# Patient Record
Sex: MALE | Race: WHITE | NOT HISPANIC OR LATINO | Employment: FULL TIME | URBAN - METROPOLITAN AREA
[De-identification: names, ages, dates, MRNs, and addresses within clinical notes are randomized per-mention and may not be internally consistent; named-entity substitution may affect disease eponyms.]

---

## 2018-10-15 DIAGNOSIS — C64.2 MALIGNANT NEOPLASM OF LEFT KIDNEY, EXCEPT RENAL PELVIS (HCC): Primary | ICD-10-CM

## 2018-11-19 ENCOUNTER — OFFICE VISIT (OUTPATIENT)
Dept: UROLOGY | Facility: MEDICAL CENTER | Age: 48
End: 2018-11-19
Payer: COMMERCIAL

## 2018-11-19 VITALS
WEIGHT: 248 LBS | BODY MASS INDEX: 35.5 KG/M2 | HEIGHT: 70 IN | DIASTOLIC BLOOD PRESSURE: 78 MMHG | SYSTOLIC BLOOD PRESSURE: 124 MMHG

## 2018-11-19 DIAGNOSIS — C64.2 MALIGNANT NEOPLASM OF LEFT KIDNEY, EXCEPT RENAL PELVIS (HCC): Primary | ICD-10-CM

## 2018-11-19 DIAGNOSIS — R19.4 CHANGE IN BOWEL HABITS: ICD-10-CM

## 2018-11-19 LAB
SL AMB  POCT GLUCOSE, UA: ABNORMAL
SL AMB LEUKOCYTE ESTERASE,UA: ABNORMAL
SL AMB POCT BILIRUBIN,UA: ABNORMAL
SL AMB POCT BLOOD,UA: ABNORMAL
SL AMB POCT CLARITY,UA: CLEAR
SL AMB POCT COLOR,UA: YELLOW
SL AMB POCT KETONES,UA: ABNORMAL
SL AMB POCT NITRITE,UA: ABNORMAL
SL AMB POCT PH,UA: 7
SL AMB POCT SPECIFIC GRAVITY,UA: 1.01
SL AMB POCT URINE PROTEIN: ABNORMAL
SL AMB POCT UROBILINOGEN: 0.2

## 2018-11-19 PROCEDURE — 81003 URINALYSIS AUTO W/O SCOPE: CPT | Performed by: UROLOGY

## 2018-11-19 PROCEDURE — 99214 OFFICE O/P EST MOD 30 MIN: CPT | Performed by: UROLOGY

## 2018-11-19 RX ORDER — MONTELUKAST SODIUM 10 MG/1
10 TABLET ORAL
Refills: 1 | COMMUNITY
Start: 2018-09-18

## 2018-11-19 RX ORDER — ASPIRIN 81 MG/1
81 TABLET, CHEWABLE ORAL
COMMUNITY
Start: 2018-09-04 | End: 2019-09-04

## 2018-11-19 RX ORDER — FLUTICASONE PROPIONATE 50 MCG
SPRAY, SUSPENSION (ML) NASAL
Refills: 3 | COMMUNITY
Start: 2018-11-07

## 2018-11-19 RX ORDER — DIPHENOXYLATE HYDROCHLORIDE AND ATROPINE SULFATE 2.5; .025 MG/1; MG/1
1 TABLET ORAL
COMMUNITY

## 2018-11-19 NOTE — PATIENT INSTRUCTIONS
I recommend seeing a gastroenterologist for possible colonoscopy due to change in bowel habits, since you are due to undergo this in 2 years anyway

## 2018-11-19 NOTE — LETTER
2018     Lyudmila Fraser, DO  [address] [place] [address]    Patient: Malathi Varela   YOB: 1970   Date of Visit: 2018       Dear Dr Carmen Fountain: Thank you for referring Malathi Varela to me for evaluation  Below are my notes for this consultation  If you have questions, please do not hesitate to call me  I look forward to following your patient along with you  Sincerely,        Blanche Perkins MD        CC: No Recipients  Blanche Perkins MD  2018  9:58 AM  Sign at close encounter  [address] [facility]  [place] [phone]  www  [facility]  org      NAME: Ramirez Zavaleta  AGE: 50 y o  SEX: male  : 1970   MRN: [id number]    DATE: 2018  TIME: 9:49 AM    Assessment and Plan:    Left renal cell carcinoma, 7 years status post left robotic partial nephrectomy with no evidence recurrence  Doing well from my standpoint  With regards to the change in his bowel habits, I recommend that he see gastroenterologist to possibly start colon cancer screening with colonoscopies early due to his history of renal cell carcinoma  Follow-up with me in 1 year with a renal ultrasound  Chief Complaint   No chief complaint on file  History of Present Illness   Left renal cell carcinoma:  7 years status post left robotic partial nephrectomy  Also has chronic microscopic hematuria, and he has a small amount of blood in his urine today  History of renal calculi status post left renal ESWL in   Renal ultrasound 10/16/2018 shows a normal postvoid residual, a duplicated right collecting system, and there was mild scarring in the mid to lower pole the left kidney status post surgery  No evidence recurrence  General health has been good       He has been having some changes in his bowel habits -his bowel function is a little slower as of lately especially related to dietary type things like eating carbohydrate such as a pasta  The following portions of the patient's history were reviewed and updated as appropriate: allergies, current medications, past family history, past medical history, past social history, past surgical history and problem list     Review of Systems   Review of Systems   Gastrointestinal: Positive for constipation  Genitourinary: Negative  Active Problem List   There is no problem list on file for this patient  Objective   /78 (BP Location: Left arm, Patient Position: Sitting)   Ht 5' 10" (1 778 m)   Wt 112 kg (248 lb)   BMI 35 58 kg/m²      Physical Exam   Constitutional: He is oriented to person, place, and time  He appears well-developed and well-nourished  HENT:   Head: Normocephalic and atraumatic  Eyes: EOM are normal    Neck: Normal range of motion  Pulmonary/Chest: Effort normal    Musculoskeletal: Normal range of motion  Neurological: He is alert and oriented to person, place, and time  Skin: Skin is warm and dry  Psychiatric: He has a normal mood and affect   His behavior is normal  Judgment and thought content normal            Current Medications     Current Outpatient Prescriptions:     aspirin 81 mg chewable tablet, Chew 81 mg, Disp: , Rfl:     fluticasone (FLONASE) 50 mcg/act nasal spray, SPRAY 2 SPRAYS INTO EACH NOSTRIL EVERY DAY, Disp: , Rfl: 3    montelukast (SINGULAIR) 10 mg tablet, Take 10 mg by mouth daily at bedtime, Disp: , Rfl: 1    multivitamin (THERAGRAN) TABS, Take 1 tablet by mouth, Disp: , Rfl:         Echo Aguilar MD

## 2018-11-19 NOTE — PROGRESS NOTES
[address] [facility]  [facility]  [address] [facility]  [place], [address] [place] [address]  [phone]  www  [facility]  org      NAME: Jacy Zavaleta  AGE: 50 y o  SEX: male  : 1970   MRN: [id number]    DATE: 2018  TIME: 9:49 AM    Assessment and Plan:    Left renal cell carcinoma, 7 years status post left robotic partial nephrectomy with no evidence recurrence  Doing well from my standpoint  With regards to the change in his bowel habits, I recommend that he see gastroenterologist to possibly start colon cancer screening with colonoscopies early due to his history of renal cell carcinoma  Follow-up with me in 1 year with a renal ultrasound  Chief Complaint   No chief complaint on file  History of Present Illness   Left renal cell carcinoma:  7 years status post left robotic partial nephrectomy  Also has chronic microscopic hematuria, and he has a small amount of blood in his urine today  History of renal calculi status post left renal ESWL in   Renal ultrasound 10/16/2018 shows a normal postvoid residual, a duplicated right collecting system, and there was mild scarring in the mid to lower pole the left kidney status post surgery  No evidence recurrence  General health has been good  He has been having some changes in his bowel habits -his bowel function is a little slower as of lately especially related to dietary type things like eating carbohydrate such as a pasta  The following portions of the patient's history were reviewed and updated as appropriate: allergies, current medications, past family history, past medical history, past social history, past surgical history and problem list     Review of Systems   Review of Systems   Gastrointestinal: Positive for constipation  Genitourinary: Negative  Active Problem List   There is no problem list on file for this patient        Objective   /78 (BP Location: Left arm, Patient Position: Sitting)   Ht 5' 10" (1 778 m)   Wt 112 kg (248 lb)   BMI 35 58 kg/m²     Physical Exam   Constitutional: He is oriented to person, place, and time  He appears well-developed and well-nourished  HENT:   Head: Normocephalic and atraumatic  Eyes: EOM are normal    Neck: Normal range of motion  Pulmonary/Chest: Effort normal    Musculoskeletal: Normal range of motion  Neurological: He is alert and oriented to person, place, and time  Skin: Skin is warm and dry  Psychiatric: He has a normal mood and affect   His behavior is normal  Judgment and thought content normal            Current Medications     Current Outpatient Prescriptions:     aspirin 81 mg chewable tablet, Chew 81 mg, Disp: , Rfl:     fluticasone (FLONASE) 50 mcg/act nasal spray, SPRAY 2 SPRAYS INTO EACH NOSTRIL EVERY DAY, Disp: , Rfl: 3    montelukast (SINGULAIR) 10 mg tablet, Take 10 mg by mouth daily at bedtime, Disp: , Rfl: 1    multivitamin (THERAGRAN) TABS, Take 1 tablet by mouth, Disp: , Rfl:         Valeri Saravia MD

## 2020-01-21 ENCOUNTER — OFFICE VISIT (OUTPATIENT)
Dept: UROLOGY | Facility: MEDICAL CENTER | Age: 50
End: 2020-01-21
Payer: COMMERCIAL

## 2020-01-21 VITALS
HEIGHT: 71 IN | BODY MASS INDEX: 34.72 KG/M2 | WEIGHT: 248 LBS | SYSTOLIC BLOOD PRESSURE: 126 MMHG | HEART RATE: 104 BPM | DIASTOLIC BLOOD PRESSURE: 78 MMHG

## 2020-01-21 DIAGNOSIS — C64.2 MALIGNANT NEOPLASM OF LEFT KIDNEY, EXCEPT RENAL PELVIS (HCC): Primary | ICD-10-CM

## 2020-01-21 DIAGNOSIS — Z12.5 ENCOUNTER FOR PROSTATE CANCER SCREENING: ICD-10-CM

## 2020-01-21 LAB
SL AMB  POCT GLUCOSE, UA: NORMAL
SL AMB LEUKOCYTE ESTERASE,UA: NORMAL
SL AMB POCT BILIRUBIN,UA: NORMAL
SL AMB POCT BLOOD,UA: NORMAL
SL AMB POCT CLARITY,UA: CLEAR
SL AMB POCT COLOR,UA: YELLOW
SL AMB POCT KETONES,UA: NORMAL
SL AMB POCT NITRITE,UA: NORMAL
SL AMB POCT PH,UA: 7
SL AMB POCT SPECIFIC GRAVITY,UA: 1.01
SL AMB POCT URINE PROTEIN: NORMAL
SL AMB POCT UROBILINOGEN: 0.2

## 2020-01-21 PROCEDURE — 81003 URINALYSIS AUTO W/O SCOPE: CPT | Performed by: UROLOGY

## 2020-01-21 PROCEDURE — 99213 OFFICE O/P EST LOW 20 MIN: CPT | Performed by: UROLOGY

## 2020-01-21 NOTE — PROGRESS NOTES
[address] [facility]  [facility]  [address] [facility], [address] [place] [address]  [phone]  www  [facility]  org      NAME: Eivn Ty  AGE: 52 y o  SEX: male  : 1970   MRN: [id number]    DATE: 2020  TIME: 10:53 AM    Assessment and Plan:    Left renal cell carcinoma it 8 years status post left robotic partial nephrectomy doing well with no evidence recurrence  Doing well from my standpoint, start actively screening for prostate cancer yearly in 1 year when he follows up with his renal ultrasound  He has a strong family history of cancer  Chief Complaint     Chief Complaint   Patient presents with    malignant neoplasm of left kidney, except renal pelvis       History of Present Illness   Follow-up left renal cell carcinoma 8 years status post left robotic partial nephrectomy  Also has a history of chronic microscopic hematuria  Renal ultrasound 2020 shows no abnormalities or hydronephrosis, and he has postsurgical changes on the left with no solid renal mass  No gross bladder abnormality  Small postvoid residual of 51 cc  Urinalysis is negative today  Last PSA was 0 8 3/9/2016  The following portions of the patient's history were reviewed and updated as appropriate: allergies, current medications, past family history, past medical history, past social history, past surgical history and problem list   Past Medical History:   Diagnosis Date    History of kidney cancer     Kidney stone     Microscopic hematuria      Past Surgical History:   Procedure Laterality Date    CHOLECYSTECTOMY      CYSTOSCOPY      with biopsy    LITHOTRIPSY      PARTIAL NEPHRECTOMY       shoulder  Review of Systems   Review of Systems   Gastrointestinal: Negative  Genitourinary: Negative  Active Problem List   There is no problem list on file for this patient        Objective   /78 (BP Location: Left arm, Patient Position: Sitting, Cuff Size: Adult)   Pulse 104   Ht 5' 11" (1 803 m)   Wt 112 kg (248 lb)   BMI 34 59 kg/m²     Physical Exam   Constitutional: He is oriented to person, place, and time  He appears well-developed and well-nourished  HENT:   Head: Normocephalic and atraumatic  Eyes: EOM are normal    Neck: Normal range of motion  Pulmonary/Chest: Effort normal    Musculoskeletal: Normal range of motion  Neurological: He is alert and oriented to person, place, and time  Skin: Skin is warm and dry  Psychiatric: He has a normal mood and affect   His behavior is normal  Judgment and thought content normal            Current Medications     Current Outpatient Medications:     fluticasone (FLONASE) 50 mcg/act nasal spray, SPRAY 2 SPRAYS INTO EACH NOSTRIL EVERY DAY, Disp: , Rfl: 3    montelukast (SINGULAIR) 10 mg tablet, Take 10 mg by mouth daily at bedtime, Disp: , Rfl: 1    multivitamin (THERAGRAN) TABS, Take 1 tablet by mouth, Disp: , Rfl:     aspirin 81 mg chewable tablet, Chew 81 mg, Disp: , Rfl:         Chalo Mccauley MD

## 2021-07-14 ENCOUNTER — TELEPHONE (OUTPATIENT)
Dept: UROLOGY | Facility: AMBULATORY SURGERY CENTER | Age: 51
End: 2021-07-14

## 2021-07-14 DIAGNOSIS — C64.2 MALIGNANT NEOPLASM OF LEFT KIDNEY, EXCEPT RENAL PELVIS (HCC): Primary | ICD-10-CM

## 2021-07-14 DIAGNOSIS — Z12.5 ENCOUNTER FOR PROSTATE CANCER SCREENING: ICD-10-CM

## 2021-07-14 NOTE — TELEPHONE ENCOUNTER
Patient managed by Dr Karen Seals [facility] [place] patient called in stating he has not seen the doctor since 1/2020  Patient has scheduled a yearly fup appt for 12/9 in   Patient asked if Dr Karen Seals would need him to get a CT or US prior to that appt   Patient can be reached at [phone]

## 2021-07-14 NOTE — TELEPHONE ENCOUNTER
Please schedule him for renal ultrasound prior to the next office visit  I put order for  It  That was my plan last year when I saw him

## 2021-07-15 NOTE — TELEPHONE ENCOUNTER
Call placed to patient and message went right to voicemail  LMOM in detail to inform patient of the recommendations of Dr Monica Oreilly and that renal US is needed prior to his upcoming appointment  Number for central scheduling was provided to the patient  Office number was also provided to the patient should he have any questions or concerns with these recommendations

## 2022-05-06 NOTE — TELEPHONE ENCOUNTER
Patient coming in for yearly f/u on 8/30/22 but needs script for kidney u/s and PSA  Could this be put in the system? He said he wall call in August prior to his appointment to make sure orders are in before he goes to get testing done  Patient can be reached at [phone]

## 2022-10-21 ENCOUNTER — HOSPITAL ENCOUNTER (OUTPATIENT)
Dept: ULTRASOUND IMAGING | Facility: HOSPITAL | Age: 52
End: 2022-10-21
Attending: UROLOGY
Payer: COMMERCIAL

## 2022-10-21 DIAGNOSIS — C64.2 MALIGNANT NEOPLASM OF LEFT KIDNEY, EXCEPT RENAL PELVIS (HCC): ICD-10-CM

## 2022-10-21 PROCEDURE — 76770 US EXAM ABDO BACK WALL COMP: CPT

## 2022-10-25 ENCOUNTER — TELEPHONE (OUTPATIENT)
Dept: UROLOGY | Facility: MEDICAL CENTER | Age: 52
End: 2022-10-25

## 2022-10-25 NOTE — RESULT ENCOUNTER NOTE
Please let patient know that his Renal US is unchanged when compared to prior imaging studies  There is scarring throughout the left kidney consistent with his partial left nephrectomy  Patient should be scheduled for follow-up as he has not been seen in 2 years and has cancelled his most recent appointments

## 2022-10-26 NOTE — TELEPHONE ENCOUNTER
Patient returning call  Patient requested our [facility] office because it is closer to his house  Patient scheduled 1/5/23 with Dr Magda Medeiros in [facility] due to patient's availability  Patient requesting a call back at [phone] to review US results

## 2022-12-30 ENCOUNTER — TELEPHONE (OUTPATIENT)
Dept: CARDIOLOGY CLINIC | Facility: CLINIC | Age: 52
End: 2022-12-30

## 2023-05-10 DIAGNOSIS — Z12.5 ENCOUNTER FOR PROSTATE CANCER SCREENING: Primary | ICD-10-CM

## 2023-05-15 ENCOUNTER — OFFICE VISIT (OUTPATIENT)
Dept: UROLOGY | Facility: MEDICAL CENTER | Age: 53
End: 2023-05-15

## 2023-05-15 VITALS — WEIGHT: 280 LBS | HEIGHT: 70 IN | BODY MASS INDEX: 40.09 KG/M2

## 2023-05-15 DIAGNOSIS — Z12.5 ENCOUNTER FOR PROSTATE CANCER SCREENING: Primary | ICD-10-CM

## 2023-05-15 DIAGNOSIS — C64.2 MALIGNANT NEOPLASM OF LEFT KIDNEY, EXCEPT RENAL PELVIS (HCC): ICD-10-CM

## 2023-05-15 RX ORDER — LORATADINE 10 MG/1
10 TABLET ORAL DAILY
COMMUNITY

## 2023-05-15 RX ORDER — PRAVASTATIN SODIUM 40 MG
TABLET ORAL
COMMUNITY
Start: 2023-05-08

## 2023-05-15 NOTE — PROGRESS NOTES
5/15/2023      Chief Complaint   Patient presents with   • Renal Cancer         Assessment and Plan    46 y o  male managed by Dr Mcdonough Breath     1  Renal cell carcinoma  · S/p robtoic left partial nephrectomy for RCC in 2012  · No evidence of recurrence on US in Oct 2022  · Follow up in 1 year, US prior to visit  Can begin to space out imaging as patient remains stable  2  Prostate cancer screening  · Previous PSA: 0 89 (2016)  · Mother with ovarian cancer   · RITU today with minimally enlarged prostate without appreciable nodule  · PSA script ordered  · Reviewed activities to avoid 72 hours prior to testing  Recommend testing in at least 2 weeks after today's rectal exam   · We will call with results      History of Present Illness  Sammy Berkowitz  is a 46 y o  male here for evaluation of renal cell carcinoma and prostate cancer screening  Patient is s/p robotic left partial nephrectomy for renal cell carcinoma in 2012  He has not had any evidence of recurrence  His most recent imaging was an ultrasound in October 2022  He denies any flank pain or gross hematuria  He had a mother with ovarian cancer but denies any prostate cancer history  His last PSA was back in 2016 at 0 89  PSA not performed prior to today's visit  Urinary wise patient is doing very well  He notes occasional urinary hesitancy but denies any other issues  Review of Systems   Constitutional: Negative for chills and fever  HENT: Negative  Respiratory: Negative  Cardiovascular: Negative  Gastrointestinal: Positive for constipation (with associated with low back pain)  Negative for abdominal pain, nausea and vomiting  Genitourinary: Negative for difficulty urinating, dysuria, flank pain, frequency, hematuria, scrotal swelling, testicular pain and urgency  Occasional hesitancy in the mornings   Skin: Negative  Neurological: Negative        AUA SYMPTOM SCORE    Flowsheet Row Most Recent Value   AUA SYMPTOM "SCORE    How often have you had a sensation of not emptying your bladder completely after you finished urinating? 0   How often have you had to urinate again less than two hours after you finished urinating? 2   How often have you found you stopped and started again several times when you urinate? 0   How often have you found it difficult to postpone urination? 0   How often have you had a weak urinary stream? 0   How often have you had to push or strain to begin urination? 0   How many times did you most typically get up to urinate from the time you went to bed at night until the time you got up in the morning? 1   Quality of Life: If you were to spend the rest of your life with your urinary condition just the way it is now, how would you feel about that? 1   AUA SYMPTOM SCORE 3          Vitals  Vitals:    05/15/23 1409   Weight: 127 kg (280 lb)   Height: 5' 10\" (1 778 m)       Physical Exam  Vitals reviewed  Constitutional:       General: He is not in acute distress  Appearance: Normal appearance  He is obese  He is not ill-appearing, toxic-appearing or diaphoretic  HENT:      Head: Normocephalic and atraumatic  Eyes:      Conjunctiva/sclera: Conjunctivae normal    Pulmonary:      Effort: Pulmonary effort is normal  No respiratory distress  Abdominal:      General: There is no distension  Palpations: Abdomen is soft  Tenderness: There is no abdominal tenderness  There is no right CVA tenderness, left CVA tenderness, guarding or rebound  Genitourinary:     Comments: RITU today with minimally enlarged prostate without appreciable nodule  Musculoskeletal:         General: Normal range of motion  Cervical back: Normal range of motion  Skin:     General: Skin is warm and dry  Neurological:      General: No focal deficit present  Mental Status: He is alert and oriented to person, place, and time     Psychiatric:         Mood and Affect: Mood normal          Behavior: Behavior " normal          Thought Content: Thought content normal          Judgment: Judgment normal        Past History  Past Medical History:   Diagnosis Date   • History of kidney cancer    • Kidney stone    • Microscopic hematuria      Social History     Socioeconomic History   • Marital status: Unknown     Spouse name: None   • Number of children: None   • Years of education: None   • Highest education level: None   Occupational History   • None   Tobacco Use   • Smoking status: None   • Smokeless tobacco: None   Substance and Sexual Activity   • Alcohol use: None   • Drug use: None   • Sexual activity: None   Other Topics Concern   • None   Social History Narrative   • None     Social Determinants of Health     Financial Resource Strain: Not on file   Food Insecurity: Not on file   Transportation Needs: Not on file   Physical Activity: Not on file   Stress: Not on file   Social Connections: Not on file   Intimate Partner Violence: Not on file   Housing Stability: Not on file     Social History     Tobacco Use   Smoking Status Not on file   Smokeless Tobacco Not on file     Family History   Problem Relation Age of Onset   • Pancreatic cancer Father    • Ovarian cancer Mother        The following portions of the patient's history were reviewed and updated as appropriate: allergies, current medications, past medical history, past social history, past surgical history and problem list     Results  No results found for this or any previous visit (from the past 1 hour(s))  ]  No results found for: PSA  No results found for: GLUCOSE, CALCIUM, NA, K, CO2, CL, BUN, CREATININE  No results found for: WBC, HGB, HCT, MCV, PLT    Donny Wise PA-C

## 2023-10-29 LAB — PSA SERPL-MCNC: 0.57 NG/ML

## 2023-10-30 NOTE — RESULT ENCOUNTER NOTE
Please let patient know his PSA was 0.57. He can have this rechecked again in 1 year.   He should have his ultrasound the kidney and bladder completed as previously recommended and follow-up in the office